# Patient Record
Sex: MALE | Race: WHITE
[De-identification: names, ages, dates, MRNs, and addresses within clinical notes are randomized per-mention and may not be internally consistent; named-entity substitution may affect disease eponyms.]

---

## 2017-08-17 ENCOUNTER — HOSPITAL ENCOUNTER (EMERGENCY)
Dept: HOSPITAL 17 - NEPD | Age: 62
Discharge: HOME | End: 2017-08-17
Payer: OTHER GOVERNMENT

## 2017-08-17 VITALS — WEIGHT: 130.07 LBS | BODY MASS INDEX: 20.42 KG/M2 | HEIGHT: 67 IN

## 2017-08-17 VITALS
DIASTOLIC BLOOD PRESSURE: 100 MMHG | TEMPERATURE: 97.9 F | SYSTOLIC BLOOD PRESSURE: 157 MMHG | RESPIRATION RATE: 20 BRPM | OXYGEN SATURATION: 94 % | HEART RATE: 106 BPM

## 2017-08-17 VITALS — OXYGEN SATURATION: 97 % | RESPIRATION RATE: 18 BRPM

## 2017-08-17 DIAGNOSIS — M13.88: ICD-10-CM

## 2017-08-17 DIAGNOSIS — F10.20: Primary | ICD-10-CM

## 2017-08-17 DIAGNOSIS — Z86.73: ICD-10-CM

## 2017-08-17 DIAGNOSIS — E87.1: ICD-10-CM

## 2017-08-17 DIAGNOSIS — F17.200: ICD-10-CM

## 2017-08-17 DIAGNOSIS — R00.0: ICD-10-CM

## 2017-08-17 DIAGNOSIS — I10: ICD-10-CM

## 2017-08-17 LAB
ALP SERPL-CCNC: 84 U/L (ref 45–117)
ALT SERPL-CCNC: 28 U/L (ref 12–78)
ANION GAP SERPL CALC-SCNC: 12 MEQ/L (ref 5–15)
AST SERPL-CCNC: 59 U/L (ref 15–37)
BASOPHILS # BLD AUTO: 0 TH/MM3 (ref 0–0.2)
BASOPHILS NFR BLD: 0.9 % (ref 0–2)
BILIRUB SERPL-MCNC: 0.6 MG/DL (ref 0.2–1)
BUN SERPL-MCNC: 6 MG/DL (ref 7–18)
CHLORIDE SERPL-SCNC: 92 MEQ/L (ref 98–107)
EOSINOPHIL # BLD: 0.1 TH/MM3 (ref 0–0.4)
EOSINOPHIL NFR BLD: 1.4 % (ref 0–4)
ERYTHROCYTE [DISTWIDTH] IN BLOOD BY AUTOMATED COUNT: 13.1 % (ref 11.6–17.2)
ETHANOL SERPL-MCNC: (no result) MG/DL (ref 0–5)
GFR SERPLBLD BASED ON 1.73 SQ M-ARVRAT: 129 ML/MIN (ref 89–?)
HCO3 BLD-SCNC: 26.3 MEQ/L (ref 21–32)
HCT VFR BLD CALC: 42.7 % (ref 39–51)
HEMO FLAGS: (no result)
LYMPHOCYTES # BLD AUTO: 1 TH/MM3 (ref 1–4.8)
LYMPHOCYTES NFR BLD AUTO: 24.5 % (ref 9–44)
MCH RBC QN AUTO: 32.3 PG (ref 27–34)
MCHC RBC AUTO-ENTMCNC: 34.4 % (ref 32–36)
MCV RBC AUTO: 93.9 FL (ref 80–100)
MONOCYTES NFR BLD: 12.8 % (ref 0–8)
NEUTROPHILS # BLD AUTO: 2.4 TH/MM3 (ref 1.8–7.7)
NEUTROPHILS NFR BLD AUTO: 60.4 % (ref 16–70)
PLAT MORPH BLD: NORMAL
PLATELET # BLD: 99 TH/MM3 (ref 150–450)
PLATELET BLD QL SMEAR: (no result)
POTASSIUM SERPL-SCNC: 4 MEQ/L (ref 3.5–5.1)
RBC # BLD AUTO: 4.55 MIL/MM3 (ref 4.5–5.9)
SCAN/DIFF: (no result)
SODIUM SERPL-SCNC: 130 MEQ/L (ref 136–145)
WBC # BLD AUTO: 4 TH/MM3 (ref 4–11)

## 2017-08-17 PROCEDURE — 96360 HYDRATION IV INFUSION INIT: CPT

## 2017-08-17 PROCEDURE — 70450 CT HEAD/BRAIN W/O DYE: CPT

## 2017-08-17 PROCEDURE — 93005 ELECTROCARDIOGRAM TRACING: CPT

## 2017-08-17 PROCEDURE — 99285 EMERGENCY DEPT VISIT HI MDM: CPT

## 2017-08-17 PROCEDURE — 84443 ASSAY THYROID STIM HORMONE: CPT

## 2017-08-17 PROCEDURE — 80307 DRUG TEST PRSMV CHEM ANLYZR: CPT

## 2017-08-17 PROCEDURE — 80053 COMPREHEN METABOLIC PANEL: CPT

## 2017-08-17 PROCEDURE — 85025 COMPLETE CBC W/AUTO DIFF WBC: CPT

## 2017-08-17 NOTE — RADRPT
EXAM DATE/TIME:  08/17/2017 15:31 

 

[facility] COMPARISON:     

No previous studies available for comparison.

 

 

INDICATIONS :     

Altered mental status. 

                      

 

RADIATION DOSE:     

30.74 CTDIvol (mGy) 

 

 

 

MEDICAL HISTORY :     

Hypertension.  

 

SURGICAL HISTORY :      

None. 

 

ENCOUNTER:      

Initial

 

ACUITY:      

1 day

 

PAIN SCALE:      

0/10

 

LOCATION:        

cranial 

 

TECHNIQUE:     

Multiple contiguous axial images were obtained of the head.  Using automated exposure control and adj
ustment of the mA and/or kV according to patient size, radiation dose was kept as low as reasonably a
chievable to obtain optimal diagnostic quality images.   DICOM format image data is available electro
nically for review and comparison.  

 

FINDINGS:     

 

CEREBRUM:     

There is diffuse moderate atrophic change with sulcal and ventricular prominence. Mild chronic small 
vessel ischemic change is present. No evidence of midline shift, mass lesion, hemorrhage or acute inf
arction.  No extra-axial fluid collections are seen.

 

POSTERIOR FOSSA:     

The cerebellum and brainstem are intact.  The 4th ventricle is midline.  The cerebellopontine angle i
s unremarkable.

 

EXTRACRANIAL:     

The visualized portion of the orbits is intact.

 

SKULL:     

The calvaria is intact.  No evidence of skull fracture.

 

CONCLUSION:     

1. No acute hemorrhage or mass effect.

2. Atrophy and chronic small vessel ischemic change.

 

 

 

 Gerard Curry MD on August 17, 2017 at 15:40           

Board Certified Radiologist.

 This report was verified electronically.

## 2017-08-17 NOTE — PD
HPI


Chief Complaint:  Medical Clearance


Time Seen by Provider:  14:48


Travel History


International Travel<30 days:  No


Contact w/Intl Traveler<30days:  No


Traveled to known affect area:  No





History of Present Illness


HPI


61-year-old male with long-standing history of alcohol dependency presents to 

the emergency today [facility].  Patient 

was seen and evaluated by his primary care provider today who was concerned 

because the patient has not eaten in the last 3 days, he has felt weak.  

Patient states he did fall one time last week striking his head.  He did not 

seek evaluation for this.  Patient denies any focal deficits.  Patient reports 

up to 8 beers daily.  His last alcoholic beverage was this morning.  He states 

he has been trying to stop on his own but is unable to.  Denies any chest 

tightness.  No difficulty breathing.  No recent illnesses, fever, or chills.  

Patient has no other symptoms to report at this time.





PFSH


Past Medical History


Arthritis:  Yes


Cerebrovascular Accident:  Yes (TIA)


Diminished Hearing:  No


Hypertension:  Yes





Past Surgical History


Eye Surgery:  Yes





Social History


Alcohol Use:  Yes (BEER DAILY)


Tobacco Use:  Yes (1/2 PPD)


Substance Use:  No





Allergies-Medications


(Allergen,Severity, Reaction):  


Coded Allergies:  


     No Known Allergies (Unverified , 8/17/17)


Reported Meds & Prescriptions





Reported Meds & Active Scripts


Active


No Active Prescriptions or Reported Medications    








Review of Systems


Except as stated in HPI:  all other systems reviewed are Neg





Physical Exam


Narrative


GENERAL: We'll attempt disheveled male patient, sitting up in bed, no acute 

distress.


SKIN: Focused skin assessment warm/dry.


HEAD: Atraumatic. Normocephalic. 


EYES: Pupils equal and round.  Disconjugated.  No scleral icterus. No injection 

or drainage. 


ENT: No nasal bleeding or discharge.  Mucous membranes pink and moist.


NECK: Trachea midline. No JVD. 


CARDIOVASCULAR: Tachycardic rate and rhythm.  


RESPIRATORY: No accessory muscle use.  Coarse, diminished bases to 

auscultation. Breath sounds equal bilaterally. 


GASTROINTESTINAL: Abdomen soft, non-tender, nondistended. Hepatic and splenic 

margins not palpable. 


MUSCULOSKELETAL: No obvious deformities. No clubbing.  No cyanosis.  No edema. 


NEUROLOGICAL: Awake and alert. No obvious cranial nerve deficits.  Motor 

grossly within normal limits. Normal speech.





Data


Data


Last Documented VS





Vital Signs








  Date Time  Temp Pulse Resp B/P Pulse Ox O2 Delivery O2 Flow Rate FiO2


 


8/17/17 15:13  77 18     


 


8/17/17 15:12     97 Room Air  


 


8/17/17 12:10 97.9   157/100    








Orders





 Complete Blood Count With Diff (8/17/17 14:55)


Comprehensive Metabolic Panel (8/17/17 14:55)


Thyroid Stimulating Hormone (8/17/17 14:55)


Urinalysis - C+S If Indicated (8/17/17 14:55)


Electrocardiogram (8/17/17 14:55)


Oximetry (8/17/17 14:55)


Iv Access Insert/Monitor (8/17/17 14:55)


Ecg Monitoring (8/17/17 14:55)


Drug Screen, Random Urine (8/17/17 14:55)


Alcohol (Ethanol) (8/17/17 14:55)


Sodium Chlor 0.9% 1000 Ml Inj (Ns 1000 M (8/17/17 15:00)


Ct Brain W/O Iv Contrast(Rout) (8/17/17 )





Labs








 Laboratory Tests








Test 8/17/17





 15:12


 


White Blood Count 4.0 TH/MM3


 


Red Blood Count 4.55 MIL/MM3


 


Hemoglobin 14.7 GM/DL


 


Hematocrit 42.7 %


 


Mean Corpuscular Volume 93.9 FL


 


Mean Corpuscular Hemoglobin 32.3 PG


 


Mean Corpuscular Hemoglobin 34.4 %





Concent 


 


Red Cell Distribution Width 13.1 %


 


Platelet Count 99 TH/MM3


 


Mean Platelet Volume 8.9 FL


 


Neutrophils (%) (Auto) 60.4 %


 


Lymphocytes (%) (Auto) 24.5 %


 


Monocytes (%) (Auto) 12.8 %


 


Eosinophils (%) (Auto) 1.4 %


 


Basophils (%) (Auto) 0.9 %


 


Neutrophils # (Auto) 2.4 TH/MM3


 


Lymphocytes # (Auto) 1.0 TH/MM3


 


Monocytes # (Auto) 0.5 TH/MM3


 


Eosinophils # (Auto) 0.1 TH/MM3


 


Basophils # (Auto) 0.0 TH/MM3


 


CBC Comment AUTO DIFF 


 


Differential Comment AUTO DIFF





 CONFIRMED


 


Platelet Estimate LOW 


 


Platelet Morphology Comment NORMAL 


 


Sodium Level 130 MEQ/L


 


Potassium Level 4.0 MEQ/L


 


Chloride Level 92 MEQ/L


 


Carbon Dioxide Level 26.3 MEQ/L


 


Anion Gap 12 MEQ/L


 


Blood Urea Nitrogen 6 MG/DL


 


Creatinine 0.63 MG/DL


 


Estimat Glomerular Filtration 129 ML/MIN





Rate 


 


Random Glucose 84 MG/DL


 


Calcium Level 9.0 MG/DL


 


Total Bilirubin 0.6 MG/DL


 


Aspartate Amino Transf 59 U/L





(AST/SGOT) 


 


Alanine Aminotransferase 28 U/L





(ALT/SGPT) 


 


Alkaline Phosphatase 84 U/L


 


Total Protein 8.9 GM/DL


 


Albumin 3.3 GM/DL


 


Thyroid Stimulating Hormone 2.490 uIU/ML





3rd Gen 


 


Ethyl Alcohol Level LESS THAN 3





 MG/DL














MDM


Medical Decision Making


Medical Screen Exam Complete:  Yes


Emergency Medical Condition:  Yes


Medical Record Reviewed:  Yes


Differential Diagnosis


Alcohol dependency versus withdrawal versus substance abuse versus mood 

disorder versus personality disorder versus electrolyte abnormality


Narrative Course


61-year-old male presents to emergency department [facility] 

[facility].  Patient is without distress.  CBC is without acute concern.  CMP is 

with a hyponatremia 1:30.  AST is slightly elevated at 59.  Other lab work is 

without acute concern.  EtOH is less than 3.  CT of the brain shows no acute 

hemorrhage or mass effect.  Atrophy and chronic small vessel ischemic changes 

noted.


Patient is medically cleared to [facility].  He will be discharged at 

this time.





Diagnosis





 Primary Impression:  


 Alcohol dependence


 Qualified Code:  F10.29 - Alcohol dependence with unspecified alcohol-induced 

disorder


Referrals:  


[facility] (Out patient)





Primary Care Physician


Patient Instructions:  Alcohol Dependence (ED), General Instructions





***Additional Instructions:


[facility]


Follow-up with a primary care provider


Return immediately with any acute worsening of symptoms


***Med/Other Pt SpecificInfo:  No Change to Meds


Scripts


No Active Prescriptions or Reported Meds


Disposition:  01 DISCHARGE HOME


Condition:  Stable








Joceline Vera Aug 17, 2017 14:48

## 2017-08-17 NOTE — PD
Physical Exam


Date Seen by Provider:  Aug 17, 2017


Time Seen by Provider:  12:18


Narrative


61 YOWM PSYCH EVAL  MED CLEARANCE FOR DETOX. BP ELEVATED. NO SI OR HI





VS REVIEWED


WAITING FOR BED PLACEMENT





Data


Data


Last Documented VS





Vital Signs








  Date Time  Temp Pulse Resp B/P Pulse Ox O2 Delivery O2 Flow Rate FiO2


 


8/17/17 12:10 97.9 106 20 157/100 94   











MDM


Supervised Visit with MELODY:  No


Scripts


No Active Prescriptions or Reported Meds








Elvin Madsen Aug 17, 2017 12:20

## 2017-08-18 NOTE — EKG
Date Performed: 08/17/2017       Time Performed: 15:09:21

 

PTAGE:      61 years

 

EKG:      Sinus rhythm 

 

 NORMAL ECG 

 

NO PREVIOUS TRACING            

 

DOCTOR:   Gonzalo Mcgraw  Interpretating Date/Time  08/18/2017 09:13:05

## 2018-01-10 ENCOUNTER — HOSPITAL ENCOUNTER (EMERGENCY)
Dept: HOSPITAL 17 - NEPK | Age: 63
Discharge: HOME | End: 2018-01-10
Payer: OTHER GOVERNMENT

## 2018-01-10 VITALS
RESPIRATION RATE: 16 BRPM | SYSTOLIC BLOOD PRESSURE: 165 MMHG | TEMPERATURE: 97.9 F | OXYGEN SATURATION: 95 % | HEART RATE: 92 BPM | DIASTOLIC BLOOD PRESSURE: 101 MMHG

## 2018-01-10 DIAGNOSIS — Z72.89: ICD-10-CM

## 2018-01-10 DIAGNOSIS — Z72.0: ICD-10-CM

## 2018-01-10 DIAGNOSIS — I10: ICD-10-CM

## 2018-01-10 DIAGNOSIS — M70.32: Primary | ICD-10-CM

## 2018-01-10 PROCEDURE — 73080 X-RAY EXAM OF ELBOW: CPT

## 2018-01-10 PROCEDURE — 99283 EMERGENCY DEPT VISIT LOW MDM: CPT

## 2018-01-10 NOTE — PD
HPI


Chief Complaint:  Skin Problem


Time Seen by Provider:  21:35


Travel History


International Travel<30 days:  No


Contact w/Intl Traveler<30days:  No


Traveled to known affect area:  No





History of Present Illness


HPI


62-year-old  male presents the emergency department with worsening 

left elbow pain and swelling over the past 3 weeks.  Patient states he slipped 

and fell outside a [email or web address] pharmacy striking his right elbow on the concrete.

  Since that time the patient has had ongoing discomfort which has waxed and 

waned over the last 3 weeks.  Patient states when he places his elbow on a 

table it is very painful and has become swollen in the last 2 days.  Denies 

fever, chills, or other symptoms.  There's been no drainage from the area.  

Pain is currently 8 out of 10.  She has not taken anything for it.  He denies 

numbness, tingling, or weakness.  He has no known drug allergies.





PFSH


Past Medical History


Arthritis:  Yes


Cerebrovascular Accident:  Yes (TIA)


Diminished Hearing:  No


Hepatitis:  Yes (c)


Hypertension:  Yes





Past Surgical History


Eye Surgery:  Yes





Social History


Alcohol Use:  Yes (BEER DAILY)


Tobacco Use:  Yes (1 PPD)


Substance Use:  No





Allergies-Medications


(Allergen,Severity, Reaction):  


Coded Allergies:  


     No Known Allergies (Unverified  Adverse Reaction, Unknown, 1/10/18)


Reported Meds & Prescriptions





Reported Meds & Active Scripts


Active


Ibuprofen 600 Mg Tab 600 Mg PO Q6H PRN








Review of Systems


Except as stated in HPI:  all other systems reviewed are Neg


General / Constitutional:  No: Fever


Eyes:  No: Visual changes


HENT:  No: Headaches


Cardiovascular:  No: Chest Pain or Discomfort


Respiratory:  No: Shortness of Breath


Gastrointestinal:  No: Abdominal Pain


Genitourinary:  No: Dysuria


Musculoskeletal:  Positive: Arthralgias, Limited ROM, Edema (see history 

present illness), Pain


Skin:  No Rash


Neurologic:  No: Weakness


Psychiatric:  No: Depression


Endocrine:  No: Polydipsia


Hematologic/Lymphatic:  No: Easy Bruising





Physical Exam


Narrative


GENERAL: Patient appears in mild distress.


SKIN: Warm and dry.  Normal color.  Normal turgor.  Patient has obvious 

swelling over the left olecranon.  There is no heat or increased erythema or 

signs of infection.


HEAD: Atraumatic. Normocephalic. 


EYES: Pupils equal and round. No scleral icterus. No injection or drainage. 


ENT: No nasal bleeding or discharge.  Mucous membranes pink and moist.


NECK: Trachea midline. No JVD. 


CARDIOVASCULAR: Regular rate and rhythm.  


RESPIRATORY: No accessory muscle use. Clear to auscultation. Breath sounds 

equal bilaterally. 


GASTROINTESTINAL: Abdomen soft, non-tender, nondistended. Hepatic and splenic 

margins not palpable. 


MUSCULOSKELETAL: Extremities without clubbing, cyanosis, or edema. No obvious 

deformities.  Left elbow is limited range of motion secondary to pain.  There 

is no weakness.


NEUROLOGICAL: Awake and alert. No obvious cranial nerve deficits.  Motor 

grossly within normal limits. Five out of 5 muscle strength in the arms and 

legs.  Normal speech.


PSYCHIATRIC: Appropriate mood and affect; insight and judgment normal.





Data


Data


Last Documented VS





Vital Signs








  Date Time  Temp Pulse Resp B/P (MAP) Pulse Ox O2 Delivery O2 Flow Rate FiO2


 


1/10/18 19:02 97.9 92 16 165/101 (122) 95 Room Air  








Orders





 Orders


Elbow, Complete (4 Vws) (1/10/18 )








[facility]


Medical Decision Making


Medical Screen Exam Complete:  Yes


Emergency Medical Condition:  Yes


Differential Diagnosis


Left elbow contusion.  L elbow bursitis.  Fracture.


Narrative Course


X-rays ordered in triage.


X-ray showed no obvious fracture but soft tissue swelling consistent with 

olecranon bursitis.  There is no free air.


Patient is placed in an Ace wrap, and given ibuprofen 600 mg 4 times a day #40.


Patient is to use ice to the area frequently through the day.


Patient is to follow up if symptoms do not improve or worsen over the next 2 

weeks.





Diagnosis





 Primary Impression:  


 Olecranon bursitis of left elbow


Referrals:  


[facility]


Patient Instructions:  Elbow Bursitis (ED), Elbow Bursitis Exercises (GEN), 

General Instructions





***Additional Instructions:  


X-ray showed no obvious fracture but soft tissue swelling consistent with 

olecranon bursitis.  There is no free air.


Patient is placed in an Ace wrap, and given ibuprofen 600 mg 4 times a day #40.


Patient is to use ice to the area frequently through the day.


Patient is to follow up if symptoms do not improve or worsen over the next 2 

weeks.


***Med/Other Pt SpecificInfo:  Prescription(s) given


Scripts


Ibuprofen (Ibuprofen) 600 Mg Tab


600 MG PO Q6H Y for Pain/Inflammation, #40 TAB 0 Refills


   Prov: Highet,Myah H. MD         1/10/18


Disposition:  01 DISCHARGE HOME


Condition:  Stable











Jonathon Benitez Jesse 10, 2018 22:05

## 2018-01-10 NOTE — RADRPT
EXAM DATE/TIME:  01/10/2018 19:56 

 

[facility] COMPARISON:     

No previous studies available for comparison.

 

                     

INDICATIONS :     

Posterior left elbow pain and swelling.  Paient fell 1 week ago.

                     

 

MEDICAL HISTORY :     

None.          

 

SURGICAL HISTORY :     

None.   

 

ENCOUNTER:     

Initial                                        

 

ACUITY:     

1 week      

 

PAIN SCORE:     

5/10

 

LOCATION:     

Left posterior elbow.

 

FINDINGS:     

Multiple view examination of the left elbow demonstrates no joint effusion, or fracture. Soft tissue 
swelling overlying the olecranon  The osseous structures are in normal alignment.  Bony mineralizatio
n is normal.

 

CONCLUSION:     

Unremarkable examination of the left elbow except for soft tissue swelling overlying the olecranon wi
th a few small bone fragments at the triceps insertion.  

 

 

 

 

 Alvin Reinoso MD on January 10, 2018 at 20:11           

Board Certified Radiologist.

 This report was verified electronically.